# Patient Record
(demographics unavailable — no encounter records)

---

## 2024-12-02 NOTE — HISTORY OF PRESENT ILLNESS
[Wheezing] : wheezing  [de-identified] : Marah Martínez is a 42-year-old lady that comes today with the following medical history.  Approxi-1 month ago she began having itchy eyes and she felt that she had a lot of mucus in her chest.  She felt she was having asthma episodes.  She was seen at urgent care where she was placed on prednisone.  This did result in improvement in her symptoms.  However she continues to cough up some discolored mucus.  She however is much better than 2 weeks ago. She is also complaining of her eczema acting up.  She is quite itchy on her trunk and arms.  She has been using mometasone which can work but she ran out of it.

## 2024-12-02 NOTE — HISTORY OF PRESENT ILLNESS
[Wheezing] : wheezing  [de-identified] : Marah Martínez is a 42-year-old lady that comes today with the following medical history.  Approxi-1 month ago she began having itchy eyes and she felt that she had a lot of mucus in her chest.  She felt she was having asthma episodes.  She was seen at urgent care where she was placed on prednisone.  This did result in improvement in her symptoms.  However she continues to cough up some discolored mucus.  She however is much better than 2 weeks ago. She is also complaining of her eczema acting up.  She is quite itchy on her trunk and arms.  She has been using mometasone which can work but she ran out of it.

## 2025-06-10 NOTE — HISTORY OF PRESENT ILLNESS
[de-identified] : COMES FOR CPE  OFFERS NO NEW COMPLAINS  LITTLE EXERCISE  [facility]

## 2025-06-10 NOTE — ASSESSMENT
[FreeTextEntry1] : CPE OF 42 Y OLD FEM = LABS AND EKG  WT CAHNGES = LIFESTYLE CHANGES RECOMM  RTO 1 Y  [Vaccines Reviewed] : Immunizations reviewed today. Please see immunization details in the vaccine log within the immunization flowsheet.

## 2025-06-10 NOTE — PHYSICAL EXAM
[No Acute Distress] : no acute distress [Normal Sclera/Conjunctiva] : normal sclera/conjunctiva [No JVD] : no jugular venous distention [Normal Outer Ear/Nose] : the outer ears and nose were normal in appearance [Normal] : normal rate, regular rhythm, normal S1 and S2 and no murmur heard [No Edema] : there was no peripheral edema [Soft] : abdomen soft [Non Tender] : non-tender [Normal Anterior Cervical Nodes] : no anterior cervical lymphadenopathy [No CVA Tenderness] : no CVA  tenderness [No Rash] : no rash [Coordination Grossly Intact] : coordination grossly intact [No Focal Deficits] : no focal deficits [Alert and Oriented x3] : oriented to person, place, and time